# Patient Record
Sex: MALE | Race: BLACK OR AFRICAN AMERICAN | Employment: FULL TIME | URBAN - METROPOLITAN AREA
[De-identification: names, ages, dates, MRNs, and addresses within clinical notes are randomized per-mention and may not be internally consistent; named-entity substitution may affect disease eponyms.]

---

## 2023-02-14 ENCOUNTER — HOSPITAL ENCOUNTER (EMERGENCY)
Age: 33
Discharge: HOME | End: 2023-02-15
Attending: STUDENT IN AN ORGANIZED HEALTH CARE EDUCATION/TRAINING PROGRAM
Payer: COMMERCIAL

## 2023-02-14 DIAGNOSIS — R55 NEAR SYNCOPE: Primary | ICD-10-CM

## 2023-02-14 LAB
COMMENT, HOLDF: NORMAL
SAMPLES BEING HELD,HOLD: NORMAL

## 2023-02-14 PROCEDURE — 84484 ASSAY OF TROPONIN QUANT: CPT

## 2023-02-14 PROCEDURE — 99284 EMERGENCY DEPT VISIT MOD MDM: CPT

## 2023-02-14 PROCEDURE — 36415 COLL VENOUS BLD VENIPUNCTURE: CPT

## 2023-02-14 PROCEDURE — 80053 COMPREHEN METABOLIC PANEL: CPT

## 2023-02-14 PROCEDURE — 85025 COMPLETE CBC W/AUTO DIFF WBC: CPT

## 2023-02-15 VITALS
BODY MASS INDEX: 27.28 KG/M2 | WEIGHT: 180 LBS | OXYGEN SATURATION: 96 % | TEMPERATURE: 97.9 F | HEART RATE: 68 BPM | RESPIRATION RATE: 18 BRPM | HEIGHT: 68 IN | DIASTOLIC BLOOD PRESSURE: 81 MMHG | SYSTOLIC BLOOD PRESSURE: 165 MMHG

## 2023-02-15 LAB
ALBUMIN SERPL-MCNC: 3.8 G/DL (ref 3.5–5)
ALBUMIN/GLOB SERPL: 0.9 (ref 1.1–2.2)
ALP SERPL-CCNC: 56 U/L (ref 45–117)
ALT SERPL-CCNC: 41 U/L (ref 12–78)
ANION GAP SERPL CALC-SCNC: 5 MMOL/L (ref 5–15)
AST SERPL-CCNC: 34 U/L (ref 15–37)
BASOPHILS # BLD: 0 K/UL (ref 0–0.1)
BASOPHILS NFR BLD: 0 % (ref 0–1)
BILIRUB SERPL-MCNC: 0.3 MG/DL (ref 0.2–1)
BUN SERPL-MCNC: 23 MG/DL (ref 6–20)
BUN/CREAT SERPL: 17 (ref 12–20)
CALCIUM SERPL-MCNC: 9.5 MG/DL (ref 8.5–10.1)
CHLORIDE SERPL-SCNC: 108 MMOL/L (ref 97–108)
CO2 SERPL-SCNC: 29 MMOL/L (ref 21–32)
CREAT SERPL-MCNC: 1.33 MG/DL (ref 0.7–1.3)
DIFFERENTIAL METHOD BLD: NORMAL
EOSINOPHIL # BLD: 0.2 K/UL (ref 0–0.4)
EOSINOPHIL NFR BLD: 3 % (ref 0–7)
ERYTHROCYTE [DISTWIDTH] IN BLOOD BY AUTOMATED COUNT: 12.9 % (ref 11.5–14.5)
GLOBULIN SER CALC-MCNC: 4.2 G/DL (ref 2–4)
GLUCOSE SERPL-MCNC: 108 MG/DL (ref 65–100)
HCT VFR BLD AUTO: 46.8 % (ref 36.6–50.3)
HGB BLD-MCNC: 14.8 G/DL (ref 12.1–17)
IMM GRANULOCYTES # BLD AUTO: 0 K/UL (ref 0–0.04)
IMM GRANULOCYTES NFR BLD AUTO: 0 % (ref 0–0.5)
LYMPHOCYTES # BLD: 1.1 K/UL (ref 0.8–3.5)
LYMPHOCYTES NFR BLD: 17 % (ref 12–49)
MCH RBC QN AUTO: 28.2 PG (ref 26–34)
MCHC RBC AUTO-ENTMCNC: 31.6 G/DL (ref 30–36.5)
MCV RBC AUTO: 89.3 FL (ref 80–99)
MONOCYTES # BLD: 0.3 K/UL (ref 0–1)
MONOCYTES NFR BLD: 5 % (ref 5–13)
NEUTS SEG # BLD: 4.9 K/UL (ref 1.8–8)
NEUTS SEG NFR BLD: 75 % (ref 32–75)
NRBC # BLD: 0 K/UL (ref 0–0.01)
NRBC BLD-RTO: 0 PER 100 WBC
PLATELET # BLD AUTO: 201 K/UL (ref 150–400)
PMV BLD AUTO: 10.8 FL (ref 8.9–12.9)
POTASSIUM SERPL-SCNC: 3.7 MMOL/L (ref 3.5–5.1)
PROT SERPL-MCNC: 8 G/DL (ref 6.4–8.2)
RBC # BLD AUTO: 5.24 M/UL (ref 4.1–5.7)
SODIUM SERPL-SCNC: 142 MMOL/L (ref 136–145)
TROPONIN I SERPL HS-MCNC: 6 NG/L (ref 0–76)
WBC # BLD AUTO: 6.5 K/UL (ref 4.1–11.1)

## 2023-02-15 NOTE — ED PROVIDER NOTES
HPI   Patient is a 34 y.o. M who presents today after \"episode\"  at home. Reports he was on the toilet, having bowel movement, stood up and \"didn't feel right. \" Denies dizziness, no nausea, no syncope, no light-headedness, no syncope. Then he lost his balance and fell onto the floor, no syncope, no LOC. Did not hit his head. No headache, no confusion, no fever. Feels completely normal at present. No chest pain, no dyspnea. ALLERGIES: Patient has no known allergies. History reviewed. No pertinent past medical history. Past Surgical History:   Procedure Laterality Date    HX UROLOGICAL         Review of Systems   Constitutional:  Negative for fatigue and fever. HENT:  Negative for congestion. Respiratory:  Negative for cough, shortness of breath and wheezing. Cardiovascular:  Negative for chest pain. Gastrointestinal:  Negative for abdominal pain, constipation, diarrhea, nausea and vomiting. Genitourinary:  Negative for flank pain. Musculoskeletal:  Negative for arthralgias and myalgias. Skin:  Negative for wound. Neurological:  Negative for dizziness, seizures, light-headedness and numbness. Psychiatric/Behavioral:  Negative for confusion. Vitals:    02/14/23 2308 02/15/23 0148   BP: (!) 148/91 (!) 165/81   Pulse: 75 68   Resp: 18 18   Temp: 98.5 °F (36.9 °C) 97.9 °F (36.6 °C)   SpO2: 97% 96%   Weight: 81.6 kg (180 lb)    Height: 5' 8\" (1.727 m)             Physical Exam  Vitals and nursing note reviewed. Constitutional:       General: He is not in acute distress. Appearance: Normal appearance. He is not ill-appearing or toxic-appearing. HENT:      Head: Normocephalic and atraumatic. No Chacon's sign, contusion or laceration. Jaw: There is normal jaw occlusion. Right Ear: External ear normal.      Left Ear: External ear normal.      Nose: Nose normal. No nasal deformity, signs of injury or laceration.       Mouth/Throat:      Mouth: Mucous membranes are moist.   Eyes: General: Lids are normal.      Conjunctiva/sclera: Conjunctivae normal.      Pupils: Pupils are equal, round, and reactive to light. Neck:      Trachea: Trachea normal.   Cardiovascular:      Rate and Rhythm: Normal rate and regular rhythm. Heart sounds: Normal heart sounds. Pulmonary:      Effort: Pulmonary effort is normal.      Breath sounds: Normal breath sounds and air entry. Abdominal:      General: Abdomen is flat. Bowel sounds are normal. There is no distension. There are no signs of injury. Palpations: Abdomen is soft. Tenderness: There is no abdominal tenderness. Musculoskeletal:      Cervical back: Full passive range of motion without pain and normal range of motion. No deformity or signs of trauma. No pain with movement, spinous process tenderness or muscular tenderness. Normal range of motion. Thoracic back: Normal. No signs of trauma, tenderness or bony tenderness. Normal range of motion. Lumbar back: Normal. No deformity, signs of trauma, tenderness or bony tenderness. Normal range of motion. Comments: + Equal strength and sensation in bilateral upper and lower extremities   Skin:     General: Skin is warm. Findings: No abrasion, bruising, ecchymosis, signs of injury, laceration or wound. Neurological:      General: No focal deficit present. Mental Status: He is alert. Mental status is at baseline. GCS: GCS eye subscore is 4. GCS verbal subscore is 5. GCS motor subscore is 6. Sensory: Sensation is intact. Motor: Motor function is intact. No weakness. Coordination: Coordination is intact. Gait: Gait is intact. Psychiatric:         Attention and Perception: Attention normal.         Mood and Affect: Mood and affect normal.         Speech: Speech normal.         Behavior: Behavior normal. Behavior is cooperative.          Cognition and Memory: Cognition and memory normal.            LABORATORY RESULTS:  Recent Results (from the past 24 hour(s))   EKG, 12 LEAD, INITIAL    Collection Time: 02/14/23 11:20 PM   Result Value Ref Range    Ventricular Rate 70 BPM    Atrial Rate 70 BPM    P-R Interval 128 ms    QRS Duration 70 ms    Q-T Interval 352 ms    QTC Calculation (Bezet) 380 ms    Calculated P Axis 63 degrees    Calculated R Axis 52 degrees    Calculated T Axis 5 degrees    Diagnosis       Normal sinus rhythm  Normal ECG  When compared with ECG of 06-JAN-2013 16:24,  ST no longer elevated in Inferior leads  ST no longer elevated in Lateral leads     CBC WITH AUTOMATED DIFF    Collection Time: 02/14/23 11:42 PM   Result Value Ref Range    WBC 6.5 4.1 - 11.1 K/uL    RBC 5.24 4.10 - 5.70 M/uL    HGB 14.8 12.1 - 17.0 g/dL    HCT 46.8 36.6 - 50.3 %    MCV 89.3 80.0 - 99.0 FL    MCH 28.2 26.0 - 34.0 PG    MCHC 31.6 30.0 - 36.5 g/dL    RDW 12.9 11.5 - 14.5 %    PLATELET 268 223 - 086 K/uL    MPV 10.8 8.9 - 12.9 FL    NRBC 0.0 0  WBC    ABSOLUTE NRBC 0.00 0.00 - 0.01 K/uL    NEUTROPHILS 75 32 - 75 %    LYMPHOCYTES 17 12 - 49 %    MONOCYTES 5 5 - 13 %    EOSINOPHILS 3 0 - 7 %    BASOPHILS 0 0 - 1 %    IMMATURE GRANULOCYTES 0 0.0 - 0.5 %    ABS. NEUTROPHILS 4.9 1.8 - 8.0 K/UL    ABS. LYMPHOCYTES 1.1 0.8 - 3.5 K/UL    ABS. MONOCYTES 0.3 0.0 - 1.0 K/UL    ABS. EOSINOPHILS 0.2 0.0 - 0.4 K/UL    ABS. BASOPHILS 0.0 0.0 - 0.1 K/UL    ABS. IMM. GRANS. 0.0 0.00 - 0.04 K/UL    DF AUTOMATED     METABOLIC PANEL, COMPREHENSIVE    Collection Time: 02/14/23 11:42 PM   Result Value Ref Range    Sodium 142 136 - 145 mmol/L    Potassium 3.7 3.5 - 5.1 mmol/L    Chloride 108 97 - 108 mmol/L    CO2 29 21 - 32 mmol/L    Anion gap 5 5 - 15 mmol/L    Glucose 108 (H) 65 - 100 mg/dL    BUN 23 (H) 6 - 20 MG/DL    Creatinine 1.33 (H) 0.70 - 1.30 MG/DL    BUN/Creatinine ratio 17 12 - 20      eGFR >60 >60 ml/min/1.73m2    Calcium 9.5 8.5 - 10.1 MG/DL    Bilirubin, total 0.3 0.2 - 1.0 MG/DL    ALT (SGPT) 41 12 - 78 U/L    AST (SGOT) 34 15 - 37 U/L    Alk.  phosphatase 56 45 - 117 U/L    Protein, total 8.0 6.4 - 8.2 g/dL    Albumin 3.8 3.5 - 5.0 g/dL    Globulin 4.2 (H) 2.0 - 4.0 g/dL    A-G Ratio 0.9 (L) 1.1 - 2.2     SAMPLES BEING HELD    Collection Time: 02/14/23 11:42 PM   Result Value Ref Range    SAMPLES BEING HELD 1red 1blu     COMMENT        Add-on orders for these samples will be processed based on acceptable specimen integrity and analyte stability, which may vary by analyte. TROPONIN-HIGH SENSITIVITY    Collection Time: 02/14/23 11:42 PM   Result Value Ref Range    Troponin-High Sensitivity 6 0 - 76 ng/L       IMAGING RESULTS:  No results found. MEDICATIONS GIVEN:  Medications - No data to display         MDM      ED Course as of 02/15/23 0155   Tue Feb 14, 2023 2332 ED EKG interpretation:11:33 PM  Rhythm: normal sinus rhythm;  Rate (approx.): 70; Axis: normal; QRS interval: normal ; ST/T wave: normal; Other findings: normal.    [JW]      ED Course User Index  [JW] Robina López MD     Patient presents today after what he describes as possible near syncope. He was on the toilet, stand, \"felt funny\" and then fell onto the floor but no actual syncope, lightheadedness or dizziness. Sustained no trauma or injury from fall. Physical exam essentially unremarkable, no focal neurological deficits, alert and oriented x4, no head laceration, no cervical midline tenderness. Given history, exam and workup, low suspicion for HF, ICH (no trauma, headache), seizure (no witnessed seizure like activity, no postictal period, tongue laceration, bladder incontinence), stroke (no focal neuro deficits), HOCM (no murmur, family history of sudden death), ACS (neg troponin, no anginal pain), aortic dissection (no chest pain). no malignant arrhythmia on ekg or any family history of sudden death. no concern for GI bleed (stable hgb). Low suspicion for PE given normal vital signs, absence of chest pain or dyspnea, no evidence of DVT, no recent surgery/immobilization.  Based on Vincentian syncope rule, patient is low risk and well appearing here, plan to discharge the patient home with PMD follow up. Did give very strict return precautions. Discussed results and work-up with patient and answered all questions, the patient expresses understanding and agrees with the care plan and disposition. The patient was given an opportunity to ask questions and all concerns raised were addressed prior to discharge. Recommended patient follow-up with provider as listed below. Counseled patient on standard home and self-care measures. Specifically explained the emergent conditions that could arise and clearly instructed the patient to return to the emergency department for those and any other new, worsening, or concerning symptoms. Patient stable and ready for discharge. History, exam, medical decision making, and plan discussed with ED attending, Dr. Edmundo Corona. IMPRESSION:  1. Near syncope        DISPOSITION:  Discharge    PLAN:  Follow-up Information       Follow up With Specialties Details [address]  Schedule an appointment as soon as possible for a visit   [address] [facility] Schedule an appointment as soon as possible for a visit   [address] [facility] [address]  [phone]          Discharge Medication List as of 2/15/2023  1:38 AM            Please note that this dictation was completed with [employer], the computer voice recognition software. Quite often unanticipated grammatical, syntax, homophones, and other interpretive errors are inadvertently transcribed by the computer software. Please disregard these errors. Please excuse any errors that have escaped final proofreading.

## 2023-02-15 NOTE — ED TRIAGE NOTES
Patient states he was in his bathroom when he became lightheaded and \"dazed\". States he fell and was unable to get up for a few minutes. Denies hitting his head and no LOC. States he feels better now but wanted to get evaluated.

## 2023-02-17 LAB
ATRIAL RATE: 70 BPM
CALCULATED P AXIS, ECG09: 63 DEGREES
CALCULATED R AXIS, ECG10: 52 DEGREES
CALCULATED T AXIS, ECG11: 5 DEGREES
DIAGNOSIS, 93000: NORMAL
P-R INTERVAL, ECG05: 128 MS
Q-T INTERVAL, ECG07: 352 MS
QRS DURATION, ECG06: 70 MS
QTC CALCULATION (BEZET), ECG08: 380 MS
VENTRICULAR RATE, ECG03: 70 BPM

## 2023-05-23 RX ORDER — IBUPROFEN 600 MG/1
600 TABLET ORAL EVERY 8 HOURS PRN
COMMUNITY
Start: 2013-01-06